# Patient Record
Sex: MALE | Race: WHITE | Employment: FULL TIME | URBAN - METROPOLITAN AREA
[De-identification: names, ages, dates, MRNs, and addresses within clinical notes are randomized per-mention and may not be internally consistent; named-entity substitution may affect disease eponyms.]

---

## 2019-08-14 ENCOUNTER — HOSPITAL ENCOUNTER (OUTPATIENT)
Dept: MRI IMAGING | Age: 54
Discharge: HOME | End: 2019-08-14
Payer: COMMERCIAL

## 2019-08-14 DIAGNOSIS — S86.219A: ICD-10-CM

## 2019-08-14 PROCEDURE — 73721 MRI JNT OF LWR EXTRE W/O DYE: CPT

## 2019-08-16 RX ORDER — CLINDAMYCIN HYDROCHLORIDE 300 MG/1
300 CAPSULE ORAL 3 TIMES DAILY
COMMUNITY
End: 2019-08-20

## 2019-08-16 NOTE — PERIOP NOTES
PAT TELEPHONE INTERVIEW COMPLETED. INSTRUCTIONS GIVEN ON OBTAINING AND USING CHLORHEXIDINE [address] THE EVENING PRIOR AND THE MORNING OF SURGERY. MEDICATION INSTRUCTIONS GIVEN. PATIENT VOICED UNDERSTANDING OF SAME.

## 2019-08-19 ENCOUNTER — ANESTHESIA EVENT (OUTPATIENT)
Dept: SURGERY | Age: 54
End: 2019-08-19
Payer: COMMERCIAL

## 2019-08-20 ENCOUNTER — ANESTHESIA (OUTPATIENT)
Dept: SURGERY | Age: 54
End: 2019-08-20
Payer: COMMERCIAL

## 2019-08-20 ENCOUNTER — HOSPITAL ENCOUNTER (OUTPATIENT)
Age: 54
Setting detail: OUTPATIENT SURGERY
Discharge: HOME | End: 2019-08-20
Attending: PODIATRIST | Admitting: PODIATRIST
Payer: COMMERCIAL

## 2019-08-20 VITALS
HEIGHT: 71 IN | BODY MASS INDEX: 28.7 KG/M2 | RESPIRATION RATE: 16 BRPM | SYSTOLIC BLOOD PRESSURE: 127 MMHG | OXYGEN SATURATION: 73 % | HEART RATE: 65 BPM | WEIGHT: 205 LBS | DIASTOLIC BLOOD PRESSURE: 87 MMHG | TEMPERATURE: 98 F

## 2019-08-20 DIAGNOSIS — G89.18 POST-OP PAIN: ICD-10-CM

## 2019-08-20 DIAGNOSIS — M79.604 RIGHT LEG PAIN: Primary | ICD-10-CM

## 2019-08-20 LAB — HGB BLD-MCNC: 16.5 G/DL (ref 12.1–17)

## 2019-08-20 PROCEDURE — 77030008684 HC TU ET CUF COVD -B: Performed by: ANESTHESIOLOGY

## 2019-08-20 PROCEDURE — 76210000021 HC REC RM PH II 0.5 TO 1 HR: Performed by: PODIATRIST

## 2019-08-20 PROCEDURE — 77030011881 HC TAPE CST FBRGLS BSNM -A: Performed by: PODIATRIST

## 2019-08-20 PROCEDURE — 76060000036 HC ANESTHESIA 2.5 TO 3 HR: Performed by: PODIATRIST

## 2019-08-20 PROCEDURE — 74011250636 HC RX REV CODE- 250/636: Performed by: ANESTHESIOLOGY

## 2019-08-20 PROCEDURE — 74011250636 HC RX REV CODE- 250/636: Performed by: NURSE ANESTHETIST, CERTIFIED REGISTERED

## 2019-08-20 PROCEDURE — 74011250637 HC RX REV CODE- 250/637: Performed by: ANESTHESIOLOGY

## 2019-08-20 PROCEDURE — 74011000272 HC RX REV CODE- 272: Performed by: PODIATRIST

## 2019-08-20 PROCEDURE — 77030002916 HC SUT ETHLN J&J -A: Performed by: PODIATRIST

## 2019-08-20 PROCEDURE — 74011000250 HC RX REV CODE- 250: Performed by: PODIATRIST

## 2019-08-20 PROCEDURE — 77030011640 HC PAD GRND REM COVD -A: Performed by: PODIATRIST

## 2019-08-20 PROCEDURE — 77030026438 HC STYL ET INTUB CARD -A: Performed by: ANESTHESIOLOGY

## 2019-08-20 PROCEDURE — 74011250636 HC RX REV CODE- 250/636: Performed by: PODIATRIST

## 2019-08-20 PROCEDURE — 85018 HEMOGLOBIN: CPT

## 2019-08-20 PROCEDURE — 74011000250 HC RX REV CODE- 250: Performed by: NURSE ANESTHETIST, CERTIFIED REGISTERED

## 2019-08-20 PROCEDURE — 76210000016 HC OR PH I REC 1 TO 1.5 HR: Performed by: PODIATRIST

## 2019-08-20 PROCEDURE — 77030003029 HC SUT VCRL J&J -B: Performed by: PODIATRIST

## 2019-08-20 PROCEDURE — 77030020782 HC GWN BAIR PAWS FLX 3M -B

## 2019-08-20 PROCEDURE — 77030003601 HC NDL NRV BLK BBMI -A

## 2019-08-20 PROCEDURE — 77030031139 HC SUT VCRL2 J&J -A: Performed by: PODIATRIST

## 2019-08-20 PROCEDURE — 77030028224 HC PDNG CST BSNM -A: Performed by: PODIATRIST

## 2019-08-20 PROCEDURE — 77030020754 HC CUF TRNQT 2BLA STRY -B: Performed by: PODIATRIST

## 2019-08-20 PROCEDURE — 76010000132 HC OR TIME 2.5 TO 3 HR: Performed by: PODIATRIST

## 2019-08-20 PROCEDURE — 77030002922 HC SUT FBRWRE ARTH -B: Performed by: PODIATRIST

## 2019-08-20 PROCEDURE — 77030018986 HC SUT ETHBND4 J&J -B: Performed by: PODIATRIST

## 2019-08-20 PROCEDURE — 77030020269 HC MISC IMPL: Performed by: PODIATRIST

## 2019-08-20 DEVICE — IMPLANTABLE DEVICE: Type: IMPLANTABLE DEVICE | Site: LEG | Status: FUNCTIONAL

## 2019-08-20 RX ORDER — HYDROMORPHONE HYDROCHLORIDE 2 MG/ML
INJECTION, SOLUTION INTRAMUSCULAR; INTRAVENOUS; SUBCUTANEOUS AS NEEDED
Status: DISCONTINUED | OUTPATIENT
Start: 2019-08-20 | End: 2019-08-20 | Stop reason: HOSPADM

## 2019-08-20 RX ORDER — SODIUM CHLORIDE 0.9 % (FLUSH) 0.9 %
5-40 SYRINGE (ML) INJECTION EVERY 8 HOURS
Status: DISCONTINUED | OUTPATIENT
Start: 2019-08-20 | End: 2019-08-20 | Stop reason: HOSPADM

## 2019-08-20 RX ORDER — GLYCOPYRROLATE 0.2 MG/ML
INJECTION INTRAMUSCULAR; INTRAVENOUS AS NEEDED
Status: DISCONTINUED | OUTPATIENT
Start: 2019-08-20 | End: 2019-08-20 | Stop reason: HOSPADM

## 2019-08-20 RX ORDER — MIDAZOLAM HYDROCHLORIDE 1 MG/ML
0.5 INJECTION, SOLUTION INTRAMUSCULAR; INTRAVENOUS
Status: DISCONTINUED | OUTPATIENT
Start: 2019-08-20 | End: 2019-08-20 | Stop reason: HOSPADM

## 2019-08-20 RX ORDER — ROPIVACAINE HYDROCHLORIDE 5 MG/ML
30 INJECTION, SOLUTION EPIDURAL; INFILTRATION; PERINEURAL ONCE
Status: DISCONTINUED | OUTPATIENT
Start: 2019-08-20 | End: 2019-08-20 | Stop reason: HOSPADM

## 2019-08-20 RX ORDER — HYDROMORPHONE HYDROCHLORIDE 1 MG/ML
0.2 INJECTION, SOLUTION INTRAMUSCULAR; INTRAVENOUS; SUBCUTANEOUS
Status: DISCONTINUED | OUTPATIENT
Start: 2019-08-20 | End: 2019-08-20 | Stop reason: HOSPADM

## 2019-08-20 RX ORDER — LIDOCAINE HYDROCHLORIDE 20 MG/ML
INJECTION, SOLUTION EPIDURAL; INFILTRATION; INTRACAUDAL; PERINEURAL AS NEEDED
Status: DISCONTINUED | OUTPATIENT
Start: 2019-08-20 | End: 2019-08-20 | Stop reason: HOSPADM

## 2019-08-20 RX ORDER — MORPHINE SULFATE 10 MG/ML
2 INJECTION, SOLUTION INTRAMUSCULAR; INTRAVENOUS
Status: DISCONTINUED | OUTPATIENT
Start: 2019-08-20 | End: 2019-08-20 | Stop reason: HOSPADM

## 2019-08-20 RX ORDER — DEXAMETHASONE SODIUM PHOSPHATE 4 MG/ML
INJECTION, SOLUTION INTRA-ARTICULAR; INTRALESIONAL; INTRAMUSCULAR; INTRAVENOUS; SOFT TISSUE AS NEEDED
Status: DISCONTINUED | OUTPATIENT
Start: 2019-08-20 | End: 2019-08-20 | Stop reason: HOSPADM

## 2019-08-20 RX ORDER — ROCURONIUM BROMIDE 10 MG/ML
INJECTION, SOLUTION INTRAVENOUS AS NEEDED
Status: DISCONTINUED | OUTPATIENT
Start: 2019-08-20 | End: 2019-08-20 | Stop reason: HOSPADM

## 2019-08-20 RX ORDER — OXYCODONE AND ACETAMINOPHEN 5; 325 MG/1; MG/1
1 TABLET ORAL
Qty: 50 TAB | Refills: 0 | Status: SHIPPED | OUTPATIENT
Start: 2019-08-20 | End: 2019-08-29

## 2019-08-20 RX ORDER — PROMETHAZINE HYDROCHLORIDE 25 MG/1
25 TABLET ORAL
Qty: 30 TAB | Refills: 0 | Status: SHIPPED | OUTPATIENT
Start: 2019-08-20

## 2019-08-20 RX ORDER — PHENYLEPHRINE HCL IN 0.9% NACL 0.4MG/10ML
SYRINGE (ML) INTRAVENOUS AS NEEDED
Status: DISCONTINUED | OUTPATIENT
Start: 2019-08-20 | End: 2019-08-20 | Stop reason: HOSPADM

## 2019-08-20 RX ORDER — DEXMEDETOMIDINE HYDROCHLORIDE 100 UG/ML
INJECTION, SOLUTION INTRAVENOUS AS NEEDED
Status: DISCONTINUED | OUTPATIENT
Start: 2019-08-20 | End: 2019-08-20 | Stop reason: HOSPADM

## 2019-08-20 RX ORDER — CLINDAMYCIN HYDROCHLORIDE 300 MG/1
300 CAPSULE ORAL 2 TIMES DAILY
Qty: 14 CAP | Refills: 0 | Status: SHIPPED | OUTPATIENT
Start: 2019-08-20 | End: 2019-08-27

## 2019-08-20 RX ORDER — SODIUM CHLORIDE, SODIUM LACTATE, POTASSIUM CHLORIDE, CALCIUM CHLORIDE 600; 310; 30; 20 MG/100ML; MG/100ML; MG/100ML; MG/100ML
75 INJECTION, SOLUTION INTRAVENOUS CONTINUOUS
Status: DISCONTINUED | OUTPATIENT
Start: 2019-08-20 | End: 2019-08-20 | Stop reason: HOSPADM

## 2019-08-20 RX ORDER — ONDANSETRON 2 MG/ML
INJECTION INTRAMUSCULAR; INTRAVENOUS AS NEEDED
Status: DISCONTINUED | OUTPATIENT
Start: 2019-08-20 | End: 2019-08-20 | Stop reason: HOSPADM

## 2019-08-20 RX ORDER — ROPIVACAINE HYDROCHLORIDE 5 MG/ML
INJECTION, SOLUTION EPIDURAL; INFILTRATION; PERINEURAL
Status: COMPLETED | OUTPATIENT
Start: 2019-08-20 | End: 2019-08-20

## 2019-08-20 RX ORDER — ONDANSETRON 2 MG/ML
4 INJECTION INTRAMUSCULAR; INTRAVENOUS AS NEEDED
Status: DISCONTINUED | OUTPATIENT
Start: 2019-08-20 | End: 2019-08-20 | Stop reason: HOSPADM

## 2019-08-20 RX ORDER — SODIUM CHLORIDE 9 MG/ML
25 INJECTION, SOLUTION INTRAVENOUS CONTINUOUS
Status: DISCONTINUED | OUTPATIENT
Start: 2019-08-20 | End: 2019-08-20 | Stop reason: HOSPADM

## 2019-08-20 RX ORDER — BUPIVACAINE HYDROCHLORIDE 5 MG/ML
INJECTION, SOLUTION EPIDURAL; INTRACAUDAL AS NEEDED
Status: DISCONTINUED | OUTPATIENT
Start: 2019-08-20 | End: 2019-08-20 | Stop reason: HOSPADM

## 2019-08-20 RX ORDER — LIDOCAINE HYDROCHLORIDE 10 MG/ML
0.1 INJECTION, SOLUTION EPIDURAL; INFILTRATION; INTRACAUDAL; PERINEURAL AS NEEDED
Status: DISCONTINUED | OUTPATIENT
Start: 2019-08-20 | End: 2019-08-20 | Stop reason: HOSPADM

## 2019-08-20 RX ORDER — MIDAZOLAM HYDROCHLORIDE 1 MG/ML
1 INJECTION, SOLUTION INTRAMUSCULAR; INTRAVENOUS AS NEEDED
Status: DISCONTINUED | OUTPATIENT
Start: 2019-08-20 | End: 2019-08-20 | Stop reason: HOSPADM

## 2019-08-20 RX ORDER — NEOSTIGMINE METHYLSULFATE 1 MG/ML
INJECTION INTRAVENOUS AS NEEDED
Status: DISCONTINUED | OUTPATIENT
Start: 2019-08-20 | End: 2019-08-20 | Stop reason: HOSPADM

## 2019-08-20 RX ORDER — SODIUM CHLORIDE 0.9 % (FLUSH) 0.9 %
5-40 SYRINGE (ML) INJECTION AS NEEDED
Status: DISCONTINUED | OUTPATIENT
Start: 2019-08-20 | End: 2019-08-20 | Stop reason: HOSPADM

## 2019-08-20 RX ORDER — PROPOFOL 10 MG/ML
INJECTION, EMULSION INTRAVENOUS AS NEEDED
Status: DISCONTINUED | OUTPATIENT
Start: 2019-08-20 | End: 2019-08-20 | Stop reason: HOSPADM

## 2019-08-20 RX ORDER — CLINDAMYCIN PHOSPHATE 900 MG/50ML
900 INJECTION INTRAVENOUS ONCE
Status: COMPLETED | OUTPATIENT
Start: 2019-08-20 | End: 2019-08-20

## 2019-08-20 RX ORDER — ACETAMINOPHEN 325 MG/1
650 TABLET ORAL ONCE
Status: COMPLETED | OUTPATIENT
Start: 2019-08-20 | End: 2019-08-20

## 2019-08-20 RX ORDER — KETOROLAC TROMETHAMINE 30 MG/ML
INJECTION, SOLUTION INTRAMUSCULAR; INTRAVENOUS AS NEEDED
Status: DISCONTINUED | OUTPATIENT
Start: 2019-08-20 | End: 2019-08-20 | Stop reason: HOSPADM

## 2019-08-20 RX ORDER — FENTANYL CITRATE 50 UG/ML
50 INJECTION, SOLUTION INTRAMUSCULAR; INTRAVENOUS AS NEEDED
Status: COMPLETED | OUTPATIENT
Start: 2019-08-20 | End: 2019-08-20

## 2019-08-20 RX ORDER — DIPHENHYDRAMINE HYDROCHLORIDE 50 MG/ML
12.5 INJECTION, SOLUTION INTRAMUSCULAR; INTRAVENOUS AS NEEDED
Status: DISCONTINUED | OUTPATIENT
Start: 2019-08-20 | End: 2019-08-20 | Stop reason: HOSPADM

## 2019-08-20 RX ORDER — FENTANYL CITRATE 50 UG/ML
INJECTION, SOLUTION INTRAMUSCULAR; INTRAVENOUS AS NEEDED
Status: DISCONTINUED | OUTPATIENT
Start: 2019-08-20 | End: 2019-08-20 | Stop reason: HOSPADM

## 2019-08-20 RX ORDER — FENTANYL CITRATE 50 UG/ML
25 INJECTION, SOLUTION INTRAMUSCULAR; INTRAVENOUS
Status: DISCONTINUED | OUTPATIENT
Start: 2019-08-20 | End: 2019-08-20 | Stop reason: HOSPADM

## 2019-08-20 RX ORDER — SODIUM CHLORIDE, SODIUM LACTATE, POTASSIUM CHLORIDE, CALCIUM CHLORIDE 600; 310; 30; 20 MG/100ML; MG/100ML; MG/100ML; MG/100ML
125 INJECTION, SOLUTION INTRAVENOUS CONTINUOUS
Status: DISCONTINUED | OUTPATIENT
Start: 2019-08-20 | End: 2019-08-20 | Stop reason: HOSPADM

## 2019-08-20 RX ORDER — SUCCINYLCHOLINE CHLORIDE 20 MG/ML
INJECTION INTRAMUSCULAR; INTRAVENOUS AS NEEDED
Status: DISCONTINUED | OUTPATIENT
Start: 2019-08-20 | End: 2019-08-20 | Stop reason: HOSPADM

## 2019-08-20 RX ADMIN — ONDANSETRON HYDROCHLORIDE 4 MG: 2 INJECTION, SOLUTION INTRAMUSCULAR; INTRAVENOUS at 08:06

## 2019-08-20 RX ADMIN — SODIUM CHLORIDE, SODIUM LACTATE, POTASSIUM CHLORIDE, AND CALCIUM CHLORIDE: 600; 310; 30; 20 INJECTION, SOLUTION INTRAVENOUS at 09:15

## 2019-08-20 RX ADMIN — DEXMEDETOMIDINE HYDROCHLORIDE 8 MCG: 100 INJECTION, SOLUTION, CONCENTRATE INTRAVENOUS at 09:22

## 2019-08-20 RX ADMIN — FENTANYL CITRATE 50 MCG: 50 INJECTION, SOLUTION INTRAMUSCULAR; INTRAVENOUS at 07:03

## 2019-08-20 RX ADMIN — Medication 80 MCG: at 07:37

## 2019-08-20 RX ADMIN — HYDROMORPHONE HYDROCHLORIDE 0.4 MG: 2 INJECTION, SOLUTION INTRAMUSCULAR; INTRAVENOUS; SUBCUTANEOUS at 09:48

## 2019-08-20 RX ADMIN — HYDROMORPHONE HYDROCHLORIDE 0.4 MG: 2 INJECTION, SOLUTION INTRAMUSCULAR; INTRAVENOUS; SUBCUTANEOUS at 09:31

## 2019-08-20 RX ADMIN — MIDAZOLAM HYDROCHLORIDE 3 MG: 1 INJECTION, SOLUTION INTRAMUSCULAR; INTRAVENOUS at 07:03

## 2019-08-20 RX ADMIN — ACETAMINOPHEN 650 MG: 325 TABLET, FILM COATED ORAL at 06:57

## 2019-08-20 RX ADMIN — DEXAMETHASONE SODIUM PHOSPHATE 8 MG: 4 INJECTION, SOLUTION INTRAMUSCULAR; INTRAVENOUS at 08:05

## 2019-08-20 RX ADMIN — SODIUM CHLORIDE, SODIUM LACTATE, POTASSIUM CHLORIDE, AND CALCIUM CHLORIDE 125 ML/HR: 600; 310; 30; 20 INJECTION, SOLUTION INTRAVENOUS at 06:31

## 2019-08-20 RX ADMIN — LIDOCAINE HYDROCHLORIDE 60 MG: 20 INJECTION, SOLUTION EPIDURAL; INFILTRATION; INTRACAUDAL; PERINEURAL at 07:34

## 2019-08-20 RX ADMIN — ROPIVACAINE HYDROCHLORIDE 30 ML: 5 INJECTION, SOLUTION EPIDURAL; INFILTRATION; PERINEURAL at 08:57

## 2019-08-20 RX ADMIN — Medication 80 MCG: at 08:02

## 2019-08-20 RX ADMIN — ROCURONIUM BROMIDE 25 MG: 10 SOLUTION INTRAVENOUS at 07:50

## 2019-08-20 RX ADMIN — PHENYLEPHRINE HYDROCHLORIDE 20 MCG/MIN: 10 INJECTION INTRAVENOUS at 08:11

## 2019-08-20 RX ADMIN — Medication 80 MCG: at 08:07

## 2019-08-20 RX ADMIN — MIDAZOLAM HYDROCHLORIDE 2 MG: 1 INJECTION, SOLUTION INTRAMUSCULAR; INTRAVENOUS at 07:25

## 2019-08-20 RX ADMIN — DEXMEDETOMIDINE HYDROCHLORIDE 8 MCG: 100 INJECTION, SOLUTION, CONCENTRATE INTRAVENOUS at 09:17

## 2019-08-20 RX ADMIN — ONDANSETRON HYDROCHLORIDE 4 MG: 2 INJECTION, SOLUTION INTRAMUSCULAR; INTRAVENOUS at 09:28

## 2019-08-20 RX ADMIN — NEOSTIGMINE METHYLSULFATE 2 MG: 1 INJECTION, SOLUTION INTRAMUSCULAR; INTRAVENOUS; SUBCUTANEOUS at 09:25

## 2019-08-20 RX ADMIN — DEXMEDETOMIDINE HYDROCHLORIDE 8 MCG: 100 INJECTION, SOLUTION, CONCENTRATE INTRAVENOUS at 09:46

## 2019-08-20 RX ADMIN — KETOROLAC TROMETHAMINE 30 MG: 30 INJECTION, SOLUTION INTRAMUSCULAR; INTRAVENOUS at 09:33

## 2019-08-20 RX ADMIN — Medication 80 MCG: at 07:56

## 2019-08-20 RX ADMIN — PROPOFOL 100 MG: 10 INJECTION, EMULSION INTRAVENOUS at 09:13

## 2019-08-20 RX ADMIN — DEXMEDETOMIDINE HYDROCHLORIDE 8 MCG: 100 INJECTION, SOLUTION, CONCENTRATE INTRAVENOUS at 09:29

## 2019-08-20 RX ADMIN — DEXMEDETOMIDINE HYDROCHLORIDE 8 MCG: 100 INJECTION, SOLUTION, CONCENTRATE INTRAVENOUS at 09:53

## 2019-08-20 RX ADMIN — CLINDAMYCIN IN 5 PERCENT DEXTROSE 900 MG: 18 INJECTION, SOLUTION INTRAVENOUS at 07:45

## 2019-08-20 RX ADMIN — PROPOFOL 200 MG: 10 INJECTION, EMULSION INTRAVENOUS at 07:34

## 2019-08-20 RX ADMIN — FENTANYL CITRATE 100 MCG: 50 INJECTION, SOLUTION INTRAMUSCULAR; INTRAVENOUS at 07:34

## 2019-08-20 RX ADMIN — GLYCOPYRROLATE 0.2 MG: 0.2 INJECTION, SOLUTION INTRAMUSCULAR; INTRAVENOUS at 09:24

## 2019-08-20 RX ADMIN — ROCURONIUM BROMIDE 5 MG: 10 SOLUTION INTRAVENOUS at 07:34

## 2019-08-20 RX ADMIN — PROPOFOL 100 MG: 10 INJECTION, EMULSION INTRAVENOUS at 09:48

## 2019-08-20 RX ADMIN — SUCCINYLCHOLINE CHLORIDE 160 MG: 20 INJECTION, SOLUTION INTRAMUSCULAR; INTRAVENOUS at 07:35

## 2019-08-20 RX ADMIN — FENTANYL CITRATE 50 MCG: 50 INJECTION, SOLUTION INTRAMUSCULAR; INTRAVENOUS at 07:25

## 2019-08-20 NOTE — ANESTHESIA POSTPROCEDURE EVALUATION
Procedure(s):  REPAIR RIGHT EXTENSOR DIGITORIUM LONGUS TENDON WITH ALLOGRAFT. general    Anesthesia Post Evaluation        Patient location during evaluation: PACU  Patient participation: complete - patient participated  Level of consciousness: awake  Pain management: adequate  Airway patency: patent  Anesthetic complications: no  Cardiovascular status: hemodynamically stable  Respiratory status: acceptable  Hydration status: acceptable  Comments: I have seen and evaluated the patient. The patient is ready for PACU discharge. [address], DO                         Vitals Value Taken Time   /69 8/20/2019 10:45 AM   Temp     Pulse 55 8/20/2019 10:50 AM   Resp 10 8/20/2019 10:50 AM   SpO2 95 % 8/20/2019 10:50 AM   Vitals shown include unvalidated device data.

## 2019-08-20 NOTE — DISCHARGE INSTRUCTIONS
[facility] Krish Sweet, DPM  [id number]  [place], [place]. [address]  Phone: [phone]  Fax: [phone]    Post-Op Instructions    Proper care during the postoperative period is an integral part of your surgical treatment program.  It is imperative that these instructions are followed to insure proper healing and to obtain the best results. 1.) Go directly home and keep your feet elevated on the way. 2.) At home, elevate your feet 6 inches above hip level by supporting feet & legs with pillows. 3.) Apply an ice bag covered with a towel just above but not on the operative site for 30 minutes out of each hour for the first 48 hours. 4.) Limited swelling is expected. Occasionally, the skin may take on a bruised appearance. There is no cause for alarm. 5.) Keep your bandages/cast clean and dry. Do NOT remove the bandages or inspect the wound. A small amount of blood on the bandage is normal.    6.) Have prescriptions filled and take medication as directed. If medication causes stomach upset, headache, rash, or other abnormal reactions, discontinue use and CALL THE DOCTOR.    7.) Get plenty of rest with the foot elevated. Drink plenty of fluids and eat regular well-balanced meals. 8.) If you have any problems or concerns, you can call the office anytime. There is a doctor on call 24 hours a day.   CALL THE OFFICE IMMEDIATELY if:   -The bandages become overly stained   -Your medications do not stop the discomfort    -You bump or injure the surgical site   -You develop a fever above 100 degrees   -You get your dressings wet    9.) Your activity level is:   _____Weightbearing as tolerated on _________ foot with surgical shoe   _____Partial weight bearing to __________ heel with surgical shoe & crutches   __X___Non weight bearing on __RIGHT_________ foot with crutches    10.) Your return appointment with Dr. Cinda Garza is:      [facility] [address] at 10:00am________________________________________________             DISCHARGE SUMMARY from Nurse    PATIENT INSTRUCTIONS:    After general anesthesia or intravenous sedation, for 24 hours or while taking prescription Narcotics:  · Limit your activities  · Do not drive and operate hazardous machinery  · Do not make important personal or business decisions  · Do  not drink alcoholic beverages  · If you have not urinated within 8 hours after discharge, please contact your surgeon on call. Report the following to your surgeon:  · Excessive pain, swelling, redness or odor of or around the surgical area  · Temperature over 100.5  · Nausea and vomiting lasting longer than 4 hours or if unable to take medications  · Any signs of decreased circulation or nerve impairment to extremity: change in color, persistent  numbness, tingling, coldness or increase pain  · Any questions    The discharge information has been reviewed with the {PATIENT PARENT GUARDIAN:05066}. The {PATIENT PARENT GUARDIAN:77516} verbalized understanding. Discharge medications reviewed with the {[employer] meds reviewed PQZP:32282} and appropriate educational materials and side effects teaching were provided.   ___________________________________________________________________________________________________________________________________

## 2019-08-20 NOTE — ROUTINE PROCESS
Patient: Charles Thompson MRN: [id number]  SSN: [id number]   YOB: 1965  Age: 49 y.o. Sex: male     Patient is status post Procedure(s):  REPAIR RIGHT EXTENSOR DIGITORIUM LONGUS TENDON WITH ALLOGRAFT  (GEN W/POP BLK).     Surgeon(s) and Role:     * Felicia Deleon DPM - Primary    Local/Dose/Irrigation:                    Peripheral IV 08/20/19 Left Hand (Active)                           Dressing/Packing:       Splint/Cast:  ]    Other:

## 2019-08-20 NOTE — ANESTHESIA PROCEDURE NOTES
Peripheral Block    Start time: 8/20/2019 7:07 AM  End time: 8/20/2019 7:13 AM  Performed by: Cristian Roberts MD  Authorized by: Cristian Roberts MD       Pre-procedure:    Indications: at surgeon's request, post-op pain management and procedure for pain    Preanesthetic Checklist: patient identified, risks and benefits discussed, site marked, timeout performed, anesthesia consent given and patient being monitored    Timeout Time: 07:07          Block Type:   Block Type:  Popliteal  Laterality:  Right  Monitoring:  Standard ASA monitoring, continuous pulse ox, frequent vital sign checks, heart rate, responsive to questions and oxygen  Injection Technique:  Single shot  Procedures: ultrasound guided and nerve stimulator    Patient Position: supine  Prep: betadine and povidone-iodine 7.5% surgical scrub    Location:  Lower thigh  Needle Type:  Stimuplex  Needle Gauge:  22 G  Needle Localization:  Nerve stimulator and ultrasound guidance  Motor Response: minimal motor response >0.4 mA      Assessment:  Number of attempts:  1  Injection Assessment:  Incremental injection every 5 mL, local visualized surrounding nerve on ultrasound, negative aspiration for CSF, negative aspiration for blood, no paresthesia, no intravascular symptoms and ultrasound image on chart  Patient tolerance:  Patient tolerated the procedure well with no immediate complications

## 2019-08-20 NOTE — OP NOTES
[address]   OPERATIVE REPORT    Name:  Rosie Reyes  MR#:  [id number]  :  1965  ACCOUNT #:  [de-identified]  DATE OF SERVICE:  2019    PREOPERATIVE DIAGNOSES:  1. Ruptured extensor digitorum longus tendon, right leg. 2.  Right lower extremity pain. POSTOPERATIVE DIAGNOSES:  1. Ruptured extensor digitorum longus tendon, right leg. 2.  Right lower extremity pain. PROCEDURE PERFORMED:  Repair of right extensor digitorum longus tendon, right lower extremity with allograft. SURGEON:  Sean Smith DPM    ASSISTANT:  None. ANESTHESIA:  General with a right lower extremity popliteal block. COMPLICATIONS:  None. SPECIMENS REMOVED:  Soft tissue, right lower extremity. IMPLANTS:  See materials section. ESTIMATED BLOOD LOSS:  15 mL. HEMOSTASIS:  Right pneumatic thigh tourniquet set at 300 mmHg for a total time of 120 minutes. MATERIALS USED:  Included # 2 [employer] FiberWire, 2-0 and 3-0 Vicryl, 2-0 and 3-0 nylon and one [employer] 4 x 7 cm piece of Graftjacket and 0 Vicryl. INJECTABLES:  Include a 25 ml of 0.5% Marcaine plain. INDICATIONS FOR PROCEDURE:  This is a 60-year-old male who suffered an anterolateral right lower leg laceration after falling through a ceramic tile roof on  while in [place]. He went to the local emergency room where he was evaluated and discharged after his laceration was repaired. The patient noticed inability to dorsiflex his foot. MRI was done and revealed a torn extensor digitorum longus tendon. He is here today for surgical intervention. DESCRIPTION OF PROCEDURE:  After the patient was properly identified by myself, my initials were placed on the right lower extremity. He received a right lower extremity popliteal block in the preoperative holding area by the anesthesiologist.  Next, the patient was brought back to the operating room under mild sedation.   Once in the operating room, [facility] the operating room table in supine position. Next, general anesthesia was administered. Once general anesthesia was administered, a pneumatic thigh tourniquet was placed in the right thigh and preset to 300 mmHg. Next, the right lower extremity was then prepped and draped in the normal aseptic fashion. Once the right foot was exsanguinated and tourniquet was inflated, the sutures that were put in by the ER physician were removed and the anterolateral laceration was then reopened using Metzenbaum scissors. A large hematoma was evacuated from the area and the surgical site was then thoroughly irrigated with normal sterile saline mixed with bacitracin solution. Next, a 15-blade was then used to extend the laceration anterocentrally in order to uncover the proximal end of the extensor digitorum longus tendon. Once the proximal end of the tendon was uncovered, the surrounding adhesions along the structure were released using a curved hemostat. Careful attention was made to avoid contact with the anterior tibial artery and surrounding neurovascular bundle. Once the proximal end of the extensor digitorum longus tendon was identified and freed, a #2 FiberWire was used to tag the tendon in a standard [place] suture technique. Once the tendon was tagged with the FiberWire, hemostat was then placed on the tag. A 15-blade was used to extend his laceration distally along the anterior lower leg. This incision was deepened using Metzenbaum scissors and the inferior extensor retinaculum was released in order to uncover the distal stump of the extensor digitorum longus tendon. Significant muscle damage was appreciated along the extensor digitorum longus muscle belly from his injury. Once the distal end of the tendon was isolated and the surrounding muscle belly was isolated, it was tagged with another #2 FiberWire.   Next, the needles were then cut from both sutures and the matching ends of the FiberWire were then tied down together in order to reapproximate the damaged tendon. Once these were tied down, the 0 Vicryl was used to reinforce the repair and then the 4 x 7 cm piece of Graftjacket was then wrapped around the repair site and sutured in place using 3-0 Vicryl in a running interlocking suture. The foot was then dorsiflexed in order to evaluate the tendon repair which was found to be adequate. The surgical site was then thoroughly irrigated in normal sterile saline mixed with Bacitracin solution. The deep structures were repaired using combination of 2-0 and 3-0 Vicryl. Subcutaneous tissues repaired using same suture. The skin was then repaired using combination of 2-0 and 3-0 nylon in a combination of horizontal mattress and simple suture-type fashion. At this time, 25 mL of 0.5% Marcaine plain was injected into the surgical site for additional postoperative anesthesia. The foot was then dressed with Xeroform, 4 x 4 gauze, Kerlix, cast padding, 5 x 30 posterior splint and double Ace wrap. The tourniquet was let down, a total time of 120 minutes. The patient tolerated the procedure well and was transferred to the recovery room, afebrile, vital signs stable, and capillary refill intact to all toes on the right foot. He was given instructions to be nonweightbearing on the right foot using crutches. He was given prescriptions for Percocet 5/325 mg, total of 50 tablets; clindamycin 300 mg total of 14 tablets; Xarelto 10 mg total of 30 tablets with one refill and then Phenergan 25 mg total of 30 tablets. The patient is to follow up with me on Friday for his first postoperative visit.       ARCELIA Millard/[email or web address]/  D:  08/20/2019 10:33  T:  08/20/2019 13:24  JOB #:  [id number]

## 2019-08-20 NOTE — BRIEF OP NOTE
BRIEF OPERATIVE NOTE    Date of Procedure: 8/20/2019   Preoperative Diagnosis: EXTENSOR DIGITORUM LONGUS TENDON RUPTURE RIGHT LOWER EXTREMITY, RIGHT ANKLE & LEG PAIN  Postoperative Diagnosis: EXTENSOR DIGITORUM LONGUS TENDON RUPTURE RIGHT LOWER EXTREMITY, RIGHT ANKLE & LEG PAIN  Procedure(s):  REPAIR RIGHT EXTENSOR DIGITORIUM LONGUS TENDON WITH ALLOGRAFT  Surgeon(s) and Role:     * Porsche Carballo DPM - Primary         Surgical Assistant: none    Surgical Staff:  Circ-1: Arielle Cabezas RN  Circ-Intern: Virgle Boas, RN  Scrub Tech-1: Candi Schilder  Scrub RN-Relief: Luis Eduardo Arcos RN  Event Time In Time Out   Incision Start 1585    Incision Close 1000      Anesthesia: General   Estimated Blood Loss: 15cc  Specimens: * No specimens in log *   Findings: ruptured EDL tendon 5cm proximal to ankle joint. Significant muscle damage from injury. No sign of infection. Complications: none  Implants:   Implant Name Type Inv.  Item Serial No.  Lot No. LRB No. Used Action   GRAFT TISS SUB SFT [id number] -- 28 SQ CM [employer] - [id number]  GRAFT TISS SUB SFT MTRX [id number] -- 28 SQ CM [employer] [id number] DVS [employer] n/a Right 1 Implanted

## 2025-01-30 ENCOUNTER — TRANSCRIBE ORDERS (OUTPATIENT)
Facility: HOSPITAL | Age: 60
End: 2025-01-30

## 2025-01-30 DIAGNOSIS — R22.41 LOWER LEG MASS, RIGHT: Primary | ICD-10-CM

## 2025-02-03 ENCOUNTER — HOSPITAL ENCOUNTER (OUTPATIENT)
Facility: HOSPITAL | Age: 60
Discharge: HOME | End: 2025-02-06
Attending: PODIATRIST
Payer: COMMERCIAL

## 2025-02-03 DIAGNOSIS — R22.41 LOWER LEG MASS, RIGHT: ICD-10-CM

## 2025-02-03 PROCEDURE — 73723 MRI JOINT LWR EXTR W/O&W/DYE: CPT

## 2025-02-03 PROCEDURE — A9579 GAD-BASE MR CONTRAST NOS,1ML: HCPCS | Performed by: PODIATRIST

## 2025-02-03 PROCEDURE — 6360000004 HC RX CONTRAST MEDICATION: Performed by: PODIATRIST

## 2025-02-03 RX ADMIN — GADOTERIDOL 19 ML: 279.3 INJECTION, SOLUTION INTRAVENOUS at 15:39

## (undated) DEVICE — SUTURE VCRL SZ 0 L18IN ABSRB VLT L40MM CT 1/2 CIR J752D

## (undated) DEVICE — SUTURE VCRL SZ 3-0 L27IN ABSRB UD L26MM SH 1/2 CIR J416H

## (undated) DEVICE — TAPE CST CNFRM 4INX4YD FBRGLS --

## (undated) DEVICE — INTENDED FOR TISSUE SEPARATION, AND OTHER PROCEDURES THAT REQUIRE A SHARP SURGICAL BLADE TO PUNCTURE OR CUT.: Brand: BARD-PARKER ® CARBON RIB-BACK BLADES

## (undated) DEVICE — BNDG ELAS HK LOOP 6X5YD NS -- MATRIX

## (undated) DEVICE — PACK,BASIC,SIRUS,V: Brand: MEDLINE

## (undated) DEVICE — HANDLE LT SNAP ON ULT DURABLE LENS FOR TRUMPF ALC DISPOSABLE

## (undated) DEVICE — BANDAGE,GAUZE,CONFORMING,3"X75",STRL,LF: Brand: MEDLINE

## (undated) DEVICE — SURGICAL PROCEDURE PACK BASIN MAJ SET CUST NO CAUT

## (undated) DEVICE — INFECTION CONTROL KIT SYS

## (undated) DEVICE — TUBING SUCT 10FR MAL ALUM SHFT FN CAP VENT UNIV CONN W/ OBT

## (undated) DEVICE — PADDING CST CRMPD 3INX4YD NS --

## (undated) DEVICE — NEEDLE HYPO 25GA L1.5IN BVL ORIENTED ECLIPSE

## (undated) DEVICE — STOCKINETTE,IMPERVIOUS,12X48,STERILE: Brand: MEDLINE

## (undated) DEVICE — DRAPE,REIN 53X77,STERILE: Brand: MEDLINE

## (undated) DEVICE — STRAP,POSITIONING,KNEE/BODY,FOAM,4X60": Brand: MEDLINE

## (undated) DEVICE — DRAPE,EXTREMITY,89X128,STERILE: Brand: MEDLINE

## (undated) DEVICE — SUTURE VCRL SZ 2-0 L27IN ABSRB UD L26MM SH 1/2 CIR J417H

## (undated) DEVICE — STRIP,CLOSURE,WOUND,MEDI-STRIP,1/2X4: Brand: MEDLINE

## (undated) DEVICE — SUT ETHBND 2-0 30IN V5 GRN-WHT --

## (undated) DEVICE — SYR 10ML LUER LOK 1/5ML GRAD --

## (undated) DEVICE — STERILE POLYISOPRENE POWDER-FREE SURGICAL GLOVES WITH EMOLLIENT COATING: Brand: PROTEXIS

## (undated) DEVICE — SUTURE FIBERWIRE SZ 2 W/ TAPERED NEEDLE BLUE L38IN NONABSORB BLU L26.5MM 1/2 CIRCLE AR7200

## (undated) DEVICE — SUTURE ETHLN SZ 4-0 L18IN NONABSORBABLE BLK L19MM PS-2 3/8 1667H

## (undated) DEVICE — SUTURE ETHLN SZ 2-0 L18IN NONABSORBABLE BLK L26MM FS 3/8 664G

## (undated) DEVICE — DRESSING PETRO W3XL8IN N ADH OIL EMUL GZ CURAD

## (undated) DEVICE — BANDAGE COMPR 9 FTX4 IN SMOOTH COMFORTABLE SYNTH ESMRK LF

## (undated) DEVICE — PADDING CST 4INX4YD --

## (undated) DEVICE — ROCKER SWITCH PENCIL BLADE ELECTRODE, HOLSTER: Brand: EDGE

## (undated) DEVICE — SUT ETHLN 3-0 18IN PS1 BLK --

## (undated) DEVICE — BANDAGE E W6INXL11YD CLP CLSR DBL LEN FLEX-MASTER

## (undated) DEVICE — SPONGE GZ W4XL4IN COT 12 PLY TYP VII WVN C FLD DSGN

## (undated) DEVICE — REM POLYHESIVE ADULT PATIENT RETURN ELECTRODE: Brand: VALLEYLAB

## (undated) DEVICE — STOCKINETTE: Brand: DEROYAL

## (undated) DEVICE — BANDAGE,GAUZE,BULKEE II,4.5"X4.1YD,STRL: Brand: MEDLINE

## (undated) DEVICE — DISPOSABLE TOURNIQUET CUFF SINGLE BLADDER, DUAL PORT AND QUICK CONNECT CONNECTOR: Brand: COLOR CUFF